# Patient Record
(demographics unavailable — no encounter records)

---

## 2024-10-18 NOTE — PHYSICAL EXAM
[de-identified] : 2 cm right thyroid nodule,well circumscribed and mobile [Laryngoscopy Performed] : laryngoscopy was performed, see procedure section for findings [Midline] : located in midline position [Normal] : orientation to person, place, and time: normal [de-identified] : indirect  laryngoscopy shows normal vocal cord mobility bilaterally with no lesions noted

## 2024-10-18 NOTE — CONSULT LETTER
[Dear  ___] : Dear  [unfilled], [Consult Letter:] : I had the pleasure of evaluating your patient, [unfilled]. [Please see my note below.] : Please see my note below. [Consult Closing:] : Thank you very much for allowing me to participate in the care of this patient.  If you have any questions, please do not hesitate to contact me. [Sincerely,] : Sincerely, [FreeTextEntry2] : Dr. Zohreh No, Dr. Evans Blackman [FreeTextEntry3] : Nikko Arevalo MD, FACS System Director, Endocrine Surgery [facility] Associate  Professor of Surgery [facility] [employer] at [facility] [DrSandy  ___] : Dr. SHETTY

## 2024-10-18 NOTE — PHYSICAL EXAM
[de-identified] : 2 cm right thyroid nodule,well circumscribed and mobile [Laryngoscopy Performed] : laryngoscopy was performed, see procedure section for findings [Midline] : located in midline position [Normal] : orientation to person, place, and time: normal [de-identified] : indirect  laryngoscopy shows normal vocal cord mobility bilaterally with no lesions noted

## 2024-10-18 NOTE — CONSULT LETTER
[Dear  ___] : Dear  [unfilled], [Consult Letter:] : I had the pleasure of evaluating your patient, [unfilled]. [Please see my note below.] : Please see my note below. [Consult Closing:] : Thank you very much for allowing me to participate in the care of this patient.  If you have any questions, please do not hesitate to contact me. [Sincerely,] : Sincerely, [FreeTextEntry2] : Dr. Zohreh No, Dr. Evans Blackman [FreeTextEntry3] : Nikko Arevalo MD, FACS System Director, Endocrine Surgery [facility] Associate  Professor of Surgery [facility] [employer] at [facility] [DrSandy  ___] : Dr. SHETTY

## 2024-10-18 NOTE — HISTORY OF PRESENT ILLNESS
[de-identified] : Pt c/o thyroid nodule found on Carotid doppler. denies any dysphagia, hoarseness, SOB or RT exposure sonogram: Right 2.5 cm, Left 1.1 cm, 1.0 cm, 1.0 cm thyroid nodules. 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  Additional information was obtained from others present at the time of visit to ensure the completeness of the history

## 2024-10-18 NOTE — HISTORY OF PRESENT ILLNESS
[de-identified] : Pt c/o thyroid nodule found on Carotid doppler. denies any dysphagia, hoarseness, SOB or RT exposure sonogram: Right 2.5 cm, Left 1.1 cm, 1.0 cm, 1.0 cm thyroid nodules. 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  Additional information was obtained from others present at the time of visit to ensure the completeness of the history

## 2024-10-18 NOTE — ASSESSMENT
[FreeTextEntry1] : bloods drawn. requested neck sonogram to assess cervical nodes. await thyroseq results. to call next week for results. patient has been given the opportunity to ask questions, and all of the patient's questions have been answered to their satisfaction